# Patient Record
Sex: MALE
[De-identification: names, ages, dates, MRNs, and addresses within clinical notes are randomized per-mention and may not be internally consistent; named-entity substitution may affect disease eponyms.]

---

## 2017-05-10 ENCOUNTER — APPOINTMENT (OUTPATIENT)
Dept: UROLOGY | Facility: CLINIC | Age: 72
End: 2017-05-10

## 2021-05-13 ENCOUNTER — APPOINTMENT (OUTPATIENT)
Dept: NEUROSURGERY | Facility: CLINIC | Age: 76
End: 2021-05-13

## 2021-05-24 ENCOUNTER — APPOINTMENT (OUTPATIENT)
Dept: OPHTHALMOLOGY | Facility: CLINIC | Age: 76
End: 2021-05-24
Payer: MEDICARE

## 2021-05-24 ENCOUNTER — NON-APPOINTMENT (OUTPATIENT)
Age: 76
End: 2021-05-24

## 2021-05-24 PROCEDURE — 92025 CPTRIZED CORNEAL TOPOGRAPHY: CPT

## 2021-05-24 PROCEDURE — 92286 ANT SGM IMG I&R SPECLR MIC: CPT

## 2021-05-24 PROCEDURE — 92004 COMPRE OPH EXAM NEW PT 1/>: CPT

## 2021-05-24 PROCEDURE — 92134 CPTRZ OPH DX IMG PST SGM RTA: CPT

## 2021-07-01 ENCOUNTER — APPOINTMENT (OUTPATIENT)
Dept: OPHTHALMOLOGY | Facility: CLINIC | Age: 76
End: 2021-07-01
Payer: MEDICARE

## 2021-07-01 ENCOUNTER — NON-APPOINTMENT (OUTPATIENT)
Age: 76
End: 2021-07-01

## 2021-07-01 PROCEDURE — 92012 INTRM OPH EXAM EST PATIENT: CPT

## 2021-07-14 ENCOUNTER — APPOINTMENT (OUTPATIENT)
Dept: OPHTHALMOLOGY | Facility: CLINIC | Age: 76
End: 2021-07-14
Payer: MEDICARE

## 2021-07-14 ENCOUNTER — NON-APPOINTMENT (OUTPATIENT)
Age: 76
End: 2021-07-14

## 2021-07-14 PROCEDURE — 92133 CPTRZD OPH DX IMG PST SGM ON: CPT

## 2021-07-14 PROCEDURE — 92012 INTRM OPH EXAM EST PATIENT: CPT

## 2021-07-14 PROCEDURE — 92025 CPTRIZED CORNEAL TOPOGRAPHY: CPT

## 2021-08-05 ENCOUNTER — APPOINTMENT (OUTPATIENT)
Dept: OPHTHALMOLOGY | Facility: CLINIC | Age: 76
End: 2021-08-05

## 2021-09-14 ENCOUNTER — TRANSCRIPTION ENCOUNTER (OUTPATIENT)
Age: 76
End: 2021-09-14

## 2021-09-16 ENCOUNTER — RESULT REVIEW (OUTPATIENT)
Age: 76
End: 2021-09-16

## 2022-01-10 ENCOUNTER — APPOINTMENT (OUTPATIENT)
Dept: SURGERY | Facility: CLINIC | Age: 77
End: 2022-01-10
Payer: MEDICARE

## 2022-01-10 VITALS
TEMPERATURE: 98.2 F | BODY MASS INDEX: 23.86 KG/M2 | DIASTOLIC BLOOD PRESSURE: 75 MMHG | WEIGHT: 180 LBS | HEIGHT: 73 IN | OXYGEN SATURATION: 98 % | SYSTOLIC BLOOD PRESSURE: 146 MMHG | RESPIRATION RATE: 17 BRPM | HEART RATE: 65 BPM

## 2022-01-10 PROCEDURE — 99204 OFFICE O/P NEW MOD 45 MIN: CPT

## 2022-01-12 NOTE — ASSESSMENT
[FreeTextEntry1] : 76-year-old male with pressure injury of the buttocks. No indication for I&D.  \par I advised for consultation with the [facility] [place] and I gave the patient the contact information.  I do not feel that he needs antibiotics at this time.\par RTO as needed.

## 2022-01-12 NOTE — HISTORY OF PRESENT ILLNESS
[FreeTextEntry1] : Jason is a 75 y/o male here for consultation visit, rule out pilonidal cyst, rule out buttock abscess. \par The patient today reports first noticing a problem with the skin of his buttocks about 2 weeks ago. Reports pain with sitting and increased pressure. Denies drainage.  He had C-spine surgery and continues to have problems with his spine, has to sit for most of the day in the recliner and feels that this has something to do with the problem with his buttocks.  He saw a few doctors and there was some concern for an abscess.  He is currently on day 3 of 7 day course of antibiotics. Reports swelling may have decreased since starting antibiotics.  However he feels that he is experiencing a minor allergic reaction (?rash) with the antibiotics.  Denies nausea/vomiting, fever, chills.

## 2022-01-12 NOTE — CONSULT LETTER
[Dear  ___] : Dear  [unfilled], [Consult Letter:] : I had the pleasure of evaluating your patient, [unfilled]. [Please see my note below.] : Please see my note below. [Consult Closing:] : Thank you very much for allowing me to participate in the care of this patient.  If you have any questions, please do not hesitate to contact me. [Sincerely,] : Sincerely, [FreeTextEntry2] : Dr Jason Muniz [FreeTextEntry3] : Sary Sampson M.D., F.A.C.S., F.JEAN-PAUL.S.C.R.S.\par Assistant Professor of Surgery\par [employer] [facility]\par \par

## 2022-01-12 NOTE — PHYSICAL EXAM
[Normal Breath Sounds] : Normal breath sounds [Normal Heart Sounds] : normal heart sounds [Alert] : alert [Oriented to Person] : oriented to person [Oriented to Place] : oriented to place [Oriented to Time] : oriented to time [Calm] : calm [JVD] : no jugular venous distention  [de-identified] : wnl [de-identified] : wnl [de-identified] : rom wnl [FreeTextEntry1] : This is a 76 year-old well-developed male in no apparent distress.\par \par Examination of the buttocks reveals bilateral diffuse skin thickening and reactive erythema along the upper aspect of the buttocks consistent with pressure injury.  There is a 1.5 cm round superficial skin ulceration on the lateral aspect of the left buttock.\par Very reactive erythema of the skin extends to the area of the coccyx.  There is a skin indentation at the area of the coccyx that is simply part of the patient's skin anatomy, it is not consistent with an active pilonidal cyst and it is not the etiology of the skin changes.  \par \par Psychiatric-good understanding of conversation.\par

## 2022-01-25 ENCOUNTER — APPOINTMENT (OUTPATIENT)
Dept: WOUND CARE | Facility: CLINIC | Age: 77
End: 2022-01-25
Payer: MEDICARE

## 2022-01-25 DIAGNOSIS — Z86.79 PERSONAL HISTORY OF OTHER DISEASES OF THE CIRCULATORY SYSTEM: ICD-10-CM

## 2022-01-25 PROCEDURE — 99204 OFFICE O/P NEW MOD 45 MIN: CPT

## 2022-01-26 RX ORDER — NYSTATIN 100000 U/G
100000 OINTMENT TOPICAL 3 TIMES DAILY
Qty: 1 | Refills: 2 | Status: ACTIVE | COMMUNITY
Start: 2022-01-26 | End: 1900-01-01

## 2022-01-26 RX ORDER — COLLAGENASE SANTYL 250 [ARB'U]/G
250 OINTMENT TOPICAL DAILY
Qty: 1 | Refills: 0 | Status: ACTIVE | COMMUNITY
Start: 2022-01-26 | End: 1900-01-01

## 2022-01-31 NOTE — HISTORY OF PRESENT ILLNESS
[FreeTextEntry1] : location  sacral buttock\par severity uncomfortable to sit no fever\par timing/duration months\par quality no bleeding\par other stomack ulcer with bleed in september 2021 admit, refluc esophagitis and colon polyp adenoma\par

## 2022-01-31 NOTE — PHYSICAL EXAM
[Normal Breath Sounds] : Normal breath sounds [Normal Rate and Rhythm] : normal rate and rhythm [2+] : left 2+ [Skin Ulcer] : ulcer [Alert] : alert [Oriented to Person] : oriented to person [Oriented to Place] : oriented to place [Oriented to Time] : oriented to time [Please See PDF for [employer]] : Please See PDF for [employer]. [JVD] : no jugular venous distention  [Abdomen Tenderness] : ~T ~M No abdominal tenderness [de-identified] : nad [de-identified] : grace [de-identified] : nl

## 2022-01-31 NOTE — ASSESSMENT
[FreeTextEntry1] :  \par 76 yr with buttuck wound, stage 2/ comp from mad, h/o BPH HTN glaucoma psoriaisi colon polyps bcca\par small pore/indent could represent sebaceous cyst, doubt pilonidal\par anti yeastmed ordered\par small ulcer healing in left buttuck cheek- santyl foam\par reeval, consider excision\par  datacomplexity mod lab, xr, old rec, test resultsreview,, visualize imagecptreview previous\par risk mod surgery\par

## 2022-01-31 NOTE — PLAN
[FreeTextEntry1] : sacral wound over 1 months time \par santyl  dry dressing to be changed by patient \par fungal periwound ordered antifungal meds  pt inst on application \par ulcer to buttocks treatment marti

## 2022-02-10 ENCOUNTER — APPOINTMENT (OUTPATIENT)
Dept: WOUND CARE | Facility: CLINIC | Age: 77
End: 2022-02-10
Payer: MEDICARE

## 2022-02-10 VITALS
DIASTOLIC BLOOD PRESSURE: 91 MMHG | HEART RATE: 82 BPM | RESPIRATION RATE: 18 BRPM | SYSTOLIC BLOOD PRESSURE: 157 MMHG | TEMPERATURE: 98 F

## 2022-02-10 PROCEDURE — 99213 OFFICE O/P EST LOW 20 MIN: CPT

## 2022-02-10 RX ORDER — NYSTATIN 100000 [USP'U]/ML
100000 SUSPENSION ORAL 3 TIMES DAILY
Qty: 105 | Refills: 1 | Status: ACTIVE | COMMUNITY
Start: 2022-02-10 | End: 1900-01-01

## 2022-02-10 NOTE — PHYSICAL EXAM
[JVD] : no jugular venous distention  [Normal Breath Sounds] : Normal breath sounds [Normal Rate and Rhythm] : normal rate and rhythm [2+] : left 2+ [Abdomen Tenderness] : ~T ~M No abdominal tenderness [Skin Ulcer] : ulcer [Alert] : alert [Oriented to Person] : oriented to person [Oriented to Place] : oriented to place [Oriented to Time] : oriented to time [de-identified] : nad [de-identified] : grace [de-identified] : nl [Please See PDF for [employer]] : Please See PDF for [employer].

## 2022-02-10 NOTE — ASSESSMENT
[FreeTextEntry1] :  \par 76 yr with buttuck wound, stage 2/ comp from mad, h/o BPH HTN glaucoma psoriaisi colon polyps bcca\par small pore/indent could represent sebaceous cyst, doubt pilonidal\par anti yeastmed ordered\par small ulcer healing in left buttuck cheek- santyl foam- smaller\par \par reeval, consider excision- will follow\par  datacomplexity mod lab, xr, old rec, test resultsreview,, visualize imagecptreview previous\par risk mod surgery\par

## 2022-02-10 NOTE — PLAN
[FreeTextEntry1] : sacral wound over 1 months time - using santyl on buttock wound and nystatin periwound\par \par santyl  dry dressing to be changed by patient \par fungal periwound ordered antifungal meds  pt inst on application \par ulcer to buttocks treatment marti

## 2022-03-03 ENCOUNTER — APPOINTMENT (OUTPATIENT)
Dept: WOUND CARE | Facility: CLINIC | Age: 77
End: 2022-03-03
Payer: MEDICARE

## 2022-03-03 VITALS
RESPIRATION RATE: 18 BRPM | DIASTOLIC BLOOD PRESSURE: 86 MMHG | TEMPERATURE: 97.9 F | HEART RATE: 76 BPM | SYSTOLIC BLOOD PRESSURE: 171 MMHG

## 2022-03-03 DIAGNOSIS — L89.319 PRESSURE ULCER OF RIGHT BUTTOCK, UNSPECIFIED STAGE: ICD-10-CM

## 2022-03-03 DIAGNOSIS — L89.329 PRESSURE ULCER OF LEFT BUTTOCK, UNSPECIFIED STAGE: ICD-10-CM

## 2022-03-03 DIAGNOSIS — B37.9 CANDIDIASIS, UNSPECIFIED: ICD-10-CM

## 2022-03-03 PROCEDURE — 99213 OFFICE O/P EST LOW 20 MIN: CPT

## 2022-03-03 NOTE — PLAN
[FreeTextEntry1] : sacral wound  closed / nystatin periwound\par \par  \par fungal periwound ordered antifungal meds  pt inst on application \par ulcer to buttocks treatment marti

## 2022-03-03 NOTE — ASSESSMENT
[FreeTextEntry1] :  \par 76 yr with buttuck wound, stage 2/ comp from mad, h/o BPH HTN glaucoma psoriaisi colon polyps bcca\par small pore/indent could represent sebaceous cyst, doubt pilonidal\par anti yeastmed ordered- improved\par small ulcer healing in left buttuck cheek- closed\par stop santyl \par marti today\par offload\par reeval, consider excision- will follow\par  datacomplexity mod lab, xr, old rec, test resultsreview,, visualize imagecptreview previous\par risk mod surgery\par

## 2022-03-03 NOTE — PHYSICAL EXAM
[Normal Breath Sounds] : Normal breath sounds [Normal Rate and Rhythm] : normal rate and rhythm [2+] : left 2+ [Skin Ulcer] : ulcer [Alert] : alert [Oriented to Person] : oriented to person [Oriented to Place] : oriented to place [Oriented to Time] : oriented to time [Please See PDF for [employer]] : Please See PDF for [employer]. [JVD] : no jugular venous distention  [Abdomen Tenderness] : ~T ~M No abdominal tenderness [de-identified] : nad [de-identified] : grace [de-identified] : nl

## 2022-03-03 NOTE — HISTORY OF PRESENT ILLNESS
[FreeTextEntry1] : location  sacral buttock\par improved with nystatin\par \par severity uncomfortable to sit no fever\par timing/duration months\par quality no bleeding\par other stomack ulcer with bleed in september 2021 admit, refluc esophagitis and colon polyp adenoma\par

## 2022-11-14 ENCOUNTER — OFFICE (OUTPATIENT)
Dept: URBAN - METROPOLITAN AREA CLINIC 32 | Facility: CLINIC | Age: 77
Setting detail: OPHTHALMOLOGY
End: 2022-11-14
Payer: MEDICARE

## 2022-11-14 ENCOUNTER — RX ONLY (RX ONLY)
Age: 77
End: 2022-11-14

## 2022-11-14 DIAGNOSIS — H20.012: ICD-10-CM

## 2022-11-14 DIAGNOSIS — H59.032: ICD-10-CM

## 2022-11-14 DIAGNOSIS — H35.411: ICD-10-CM

## 2022-11-14 PROCEDURE — 67028 INJECTION EYE DRUG: CPT | Performed by: OPHTHALMOLOGY

## 2022-11-14 PROCEDURE — 92134 CPTRZ OPH DX IMG PST SGM RTA: CPT | Performed by: OPHTHALMOLOGY

## 2022-11-14 ASSESSMENT — KERATOMETRY
OD_AXISANGLE_DEGREES: 121
OD_K1POWER_DIOPTERS: 43.00
OS_K1POWER_DIOPTERS: 41.25
OS_AXISANGLE_DEGREES: 066
OD_K2POWER_DIOPTERS: 45.50
OS_K2POWER_DIOPTERS: 44.00

## 2022-11-14 ASSESSMENT — REFRACTION_AUTOREFRACTION
OS_AXIS: 040
OD_AXIS: 035
OS_CYLINDER: -1.75
OD_SPHERE: +0.25
OS_SPHERE: +1.50
OD_CYLINDER: -2.25

## 2022-11-14 ASSESSMENT — SUPERFICIAL PUNCTATE KERATITIS (SPK)
OS_SPK: 1+
OD_SPK: 1+

## 2022-11-14 ASSESSMENT — SPHEQUIV_DERIVED
OS_SPHEQUIV: 0.625
OD_SPHEQUIV: -0.875

## 2022-11-14 ASSESSMENT — AXIALLENGTH_DERIVED
OD_AL: 23.6575
OS_AL: 23.6701

## 2022-11-14 ASSESSMENT — VISUAL ACUITY
OD_BCVA: 20/50
OS_BCVA: 20/30+

## 2022-11-14 ASSESSMENT — CONFRONTATIONAL VISUAL FIELD TEST (CVF)
OD_FINDINGS: FULL
OS_FINDINGS: FULL

## 2022-11-22 ENCOUNTER — APPOINTMENT (OUTPATIENT)
Dept: WOUND CARE | Facility: CLINIC | Age: 77
End: 2022-11-22

## 2022-12-20 ENCOUNTER — OFFICE (OUTPATIENT)
Dept: URBAN - METROPOLITAN AREA CLINIC 70 | Facility: CLINIC | Age: 77
Setting detail: OPHTHALMOLOGY
End: 2022-12-20
Payer: MEDICARE

## 2022-12-20 DIAGNOSIS — H40.1131: ICD-10-CM

## 2022-12-20 DIAGNOSIS — Z96.1: ICD-10-CM

## 2022-12-20 DIAGNOSIS — H18.232: ICD-10-CM

## 2022-12-20 DIAGNOSIS — H16.223: ICD-10-CM

## 2022-12-20 PROBLEM — H20.012: Status: ACTIVE | Noted: 2022-11-14

## 2022-12-20 PROBLEM — H35.411 LATTICE DEGENERATION; RIGHT EYE: Status: ACTIVE | Noted: 2022-11-14

## 2022-12-20 PROCEDURE — 99213 OFFICE O/P EST LOW 20 MIN: CPT | Performed by: OPHTHALMOLOGY

## 2022-12-20 ASSESSMENT — REFRACTION_AUTOREFRACTION
OS_CYLINDER: -2.50
OS_AXIS: 150
OD_SPHERE: +0.25
OS_SPHERE: +1.25
OD_AXIS: 023
OD_CYLINDER: -2.00

## 2022-12-20 ASSESSMENT — SPHEQUIV_DERIVED
OS_SPHEQUIV: 0
OD_SPHEQUIV: -0.75

## 2022-12-20 ASSESSMENT — SUPERFICIAL PUNCTATE KERATITIS (SPK)
OS_SPK: 1+
OD_SPK: 1+

## 2022-12-20 ASSESSMENT — KERATOMETRY
OD_K2POWER_DIOPTERS: 45.50
OD_AXISANGLE_DEGREES: 117
OS_K1POWER_DIOPTERS: 41.25
OD_K1POWER_DIOPTERS: 43.00
OS_K2POWER_DIOPTERS: 44.00
OS_AXISANGLE_DEGREES: 066

## 2022-12-20 ASSESSMENT — AXIALLENGTH_DERIVED
OD_AL: 23.6086
OS_AL: 23.9177

## 2022-12-20 ASSESSMENT — CONFRONTATIONAL VISUAL FIELD TEST (CVF)
OS_FINDINGS: FULL
OD_FINDINGS: FULL

## 2022-12-20 ASSESSMENT — VISUAL ACUITY
OS_BCVA: 20/50+2
OD_BCVA: 20/50+2

## 2023-02-14 ENCOUNTER — OFFICE (OUTPATIENT)
Dept: URBAN - METROPOLITAN AREA CLINIC 32 | Facility: CLINIC | Age: 78
Setting detail: OPHTHALMOLOGY
End: 2023-02-14
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
## 2023-02-14 PROCEDURE — NO SHOW FE NO SHOW FEE: Performed by: OPHTHALMOLOGY

## 2024-02-23 ENCOUNTER — NON-APPOINTMENT (OUTPATIENT)
Age: 79
End: 2024-02-23

## 2024-04-25 ENCOUNTER — OFFICE (OUTPATIENT)
Dept: URBAN - METROPOLITAN AREA CLINIC 32 | Facility: CLINIC | Age: 79
Setting detail: OPHTHALMOLOGY
End: 2024-04-25
Payer: MEDICARE

## 2024-04-25 DIAGNOSIS — H59.032: ICD-10-CM

## 2024-04-25 DIAGNOSIS — H43.811: ICD-10-CM

## 2024-04-25 DIAGNOSIS — H20.012: ICD-10-CM

## 2024-04-25 PROCEDURE — 92134 CPTRZ OPH DX IMG PST SGM RTA: CPT | Performed by: OPHTHALMOLOGY

## 2024-04-25 PROCEDURE — 67028 INJECTION EYE DRUG: CPT | Mod: LT | Performed by: OPHTHALMOLOGY

## 2024-04-25 PROCEDURE — 92235 FLUORESCEIN ANGRPH MLTIFRAME: CPT | Performed by: OPHTHALMOLOGY

## 2024-05-21 ENCOUNTER — RX ONLY (RX ONLY)
Age: 79
End: 2024-05-21

## 2024-05-21 ENCOUNTER — OFFICE (OUTPATIENT)
Dept: URBAN - METROPOLITAN AREA CLINIC 70 | Facility: CLINIC | Age: 79
Setting detail: OPHTHALMOLOGY
End: 2024-05-21
Payer: MEDICARE

## 2024-05-21 DIAGNOSIS — H40.1131: ICD-10-CM

## 2024-05-21 DIAGNOSIS — H16.223: ICD-10-CM

## 2024-05-21 DIAGNOSIS — H18.232: ICD-10-CM

## 2024-05-21 DIAGNOSIS — Z96.1: ICD-10-CM

## 2024-05-21 PROCEDURE — 92012 INTRM OPH EXAM EST PATIENT: CPT | Performed by: OPHTHALMOLOGY

## 2024-05-21 PROCEDURE — 92133 CPTRZD OPH DX IMG PST SGM ON: CPT | Performed by: OPHTHALMOLOGY

## 2024-05-21 ASSESSMENT — CONFRONTATIONAL VISUAL FIELD TEST (CVF)
OS_FINDINGS: FULL
OD_FINDINGS: FULL

## 2024-07-25 ENCOUNTER — DOCTOR'S OFFICE (OUTPATIENT)
Age: 79
Setting detail: OPHTHALMOLOGY
End: 2024-07-25
Payer: MEDICARE

## 2024-07-25 DIAGNOSIS — H35.411: ICD-10-CM

## 2024-07-25 DIAGNOSIS — H20.012: ICD-10-CM

## 2024-07-25 DIAGNOSIS — H59.032: ICD-10-CM

## 2024-07-25 DIAGNOSIS — H43.811: ICD-10-CM

## 2024-07-25 PROCEDURE — 99214 OFFICE O/P EST MOD 30 MIN: CPT | Performed by: OPHTHALMOLOGY

## 2024-07-25 PROCEDURE — 92134 CPTRZ OPH DX IMG PST SGM RTA: CPT | Performed by: OPHTHALMOLOGY

## 2024-11-21 ENCOUNTER — OFFICE (OUTPATIENT)
Dept: URBAN - METROPOLITAN AREA CLINIC 70 | Facility: CLINIC | Age: 79
Setting detail: OPHTHALMOLOGY
End: 2024-11-21
Payer: MEDICARE

## 2024-11-21 DIAGNOSIS — H40.1131: ICD-10-CM

## 2024-11-21 DIAGNOSIS — H16.223: ICD-10-CM

## 2024-11-21 DIAGNOSIS — H18.232: ICD-10-CM

## 2024-11-21 DIAGNOSIS — Z96.1: ICD-10-CM

## 2024-11-21 PROCEDURE — 92012 INTRM OPH EXAM EST PATIENT: CPT | Performed by: OPHTHALMOLOGY

## 2024-11-21 PROCEDURE — 92083 EXTENDED VISUAL FIELD XM: CPT | Performed by: OPHTHALMOLOGY

## 2024-11-21 ASSESSMENT — KERATOMETRY
OD_AXISANGLE_DEGREES: 113
OS_K1POWER_DIOPTERS: UNABLE
OD_K1POWER_DIOPTERS: 43.50
OD_K2POWER_DIOPTERS: 45.50

## 2024-11-21 ASSESSMENT — REFRACTION_AUTOREFRACTION
OS_SPHERE: +5.50
OS_CYLINDER: -6.50
OD_AXIS: 043
OS_AXIS: 154
OD_SPHERE: -1.00
OD_CYLINDER: -1.25

## 2024-11-21 ASSESSMENT — VISUAL ACUITY
OS_BCVA: 20/30
OD_BCVA: 20/50-

## 2024-11-21 ASSESSMENT — CONFRONTATIONAL VISUAL FIELD TEST (CVF)
OS_FINDINGS: FULL
OD_FINDINGS: FULL

## 2024-11-21 ASSESSMENT — SUPERFICIAL PUNCTATE KERATITIS (SPK)
OS_SPK: 1+
OD_SPK: 1+

## 2025-01-19 ENCOUNTER — NON-APPOINTMENT (OUTPATIENT)
Age: 80
End: 2025-01-19

## 2025-03-11 ENCOUNTER — DOCTOR'S OFFICE (OUTPATIENT)
Facility: LOCATION | Age: 80
Setting detail: OPHTHALMOLOGY
End: 2025-03-11
Payer: MEDICARE

## 2025-03-11 DIAGNOSIS — H35.411: ICD-10-CM

## 2025-03-11 DIAGNOSIS — H59.032: ICD-10-CM

## 2025-03-11 DIAGNOSIS — H43.811: ICD-10-CM

## 2025-03-11 DIAGNOSIS — H20.012: ICD-10-CM

## 2025-03-11 PROCEDURE — 99214 OFFICE O/P EST MOD 30 MIN: CPT | Performed by: OPHTHALMOLOGY

## 2025-03-11 PROCEDURE — 92134 CPTRZ OPH DX IMG PST SGM RTA: CPT | Performed by: OPHTHALMOLOGY

## 2025-03-11 ASSESSMENT — REFRACTION_AUTOREFRACTION
OD_AXIS: 043
OS_CYLINDER: -6.50
OD_CYLINDER: -1.25
OS_SPHERE: +5.50
OS_AXIS: 154
OD_SPHERE: -1.00

## 2025-03-11 ASSESSMENT — KERATOMETRY
OS_K1POWER_DIOPTERS: UNABLE
OD_K2POWER_DIOPTERS: 45.50
OD_K1POWER_DIOPTERS: 43.50
OD_AXISANGLE_DEGREES: 113

## 2025-03-11 ASSESSMENT — VISUAL ACUITY
OD_BCVA: 20/50-
OS_BCVA: 20/25-

## 2025-03-11 ASSESSMENT — SUPERFICIAL PUNCTATE KERATITIS (SPK)
OS_SPK: 1+
OD_SPK: 1+

## 2025-03-11 ASSESSMENT — TONOMETRY
OD_IOP_MMHG: 16
OS_IOP_MMHG: 10